# Patient Record
Sex: MALE | Race: WHITE | NOT HISPANIC OR LATINO | Employment: FULL TIME | URBAN - METROPOLITAN AREA
[De-identification: names, ages, dates, MRNs, and addresses within clinical notes are randomized per-mention and may not be internally consistent; named-entity substitution may affect disease eponyms.]

---

## 2024-01-12 ENCOUNTER — APPOINTMENT (OUTPATIENT)
Dept: ORTHOPEDIC SURGERY | Facility: CLINIC | Age: 69
End: 2024-01-12
Payer: MEDICARE

## 2024-01-12 ENCOUNTER — OFFICE VISIT (OUTPATIENT)
Dept: ORTHOPEDIC SURGERY | Facility: CLINIC | Age: 69
End: 2024-01-12
Payer: MEDICARE

## 2024-01-12 DIAGNOSIS — M50.20 CERVICAL DISC DISPLACEMENT: ICD-10-CM

## 2024-01-12 DIAGNOSIS — M54.12 CERVICAL RADICULOPATHY: Primary | ICD-10-CM

## 2024-01-12 PROCEDURE — 99205 OFFICE O/P NEW HI 60 MIN: CPT | Performed by: ORTHOPAEDIC SURGERY

## 2024-01-12 NOTE — LETTER
January 12, 2024     Domingo Ugalde MD  [address] [facility] [address]    Patient: Rj Heller   YOB: 1955   Date of Visit: 1/12/2024       Dear Dr. Domingo Ugalde MD:    Thank you for referring Rj Heller to me for evaluation. Below are my notes for this consultation.  If you have questions, please do not hesitate to call me. I look forward to following your patient along with you.       Sincerely,     Negro Garcia MD      CC: No Recipients  ______________________________________________________________________________________    HPI:Rj Heller is a 68-year-old man, comes in today for second opinion.  For the last several months he has been having some pain into his right arm goes down the arm in a C6 distribution.  There is associated numbness and tingling.  He also has intermittent intrascapular pain.  He has been doing physical therapy, cervical traction, and has had oral steroids.  His symptoms overall are tolerable.  He has been treated at the [facility].  He comes in for second opinion.      ROS:  Reviewed on EMR and patient intake sheet.    PMH/SH:   Reviewed on EMR and patient intake sheet.    Exam:  Physical Exam    Constitutional: Well appearing; no acute distress  Eyes: pupils are equal and round  Psych: normal affect  Respiratory: non-labored breathing  Cardiovascular: regular rate and rhythm  GI: non-distended abdomen  Musculoskeletal: no pain with range of motion of the shoulders bilaterally; no signs of impingement  Neurologic: [5]/5 strength in the upper extremities bilaterally]; [negative Stern's]; [no hyper-reflexia]; positive Spurling's    Radiology:  MRI was personally reviewed.  It does demonstrate a C4-5 spondylolisthesis.  He has a right paracentral and foraminal disc herniation at C5-6 producing C6 root compression.  No cervical canal stenosis or cord compression.      Diagnosis:  Cervical radiculopathy    Assessment and  Plan:   68-year-old man, with cervical radiculopathy secondary to C5-6 disc herniation.  As long as his symptoms are tolerable, the nonoperative management would certainly be appropriate.  We did talk today about the utility of steroid injections.  I discussed the potential risks of those injections, including but not limited to the potential for stroke and/or neurologic injury, and advised against injections.  Additionally, we talked about surgery in case his symptoms do become intolerable long-term.  He would need a C5-6 ACDF.  We went over that surgery today, in regards to the specifics of the procedure, the approach, the expected outcomes and the potential for any long-term associated complications or ramifications of surgery.  The patient at this time would like to continue with nonoperative management.  He was appreciative for the second opinion.  He will follow-up as necessary should his symptoms become intolerable.    The patient was in agreement with the plan. At the end of the visit today, the patient felt that all questions had been answered satisfactorily.  The patient was pleased with the visit and very appreciative for the care rendered.     Thank you very much for the kind referral.  It is a privilege, and a pleasure, to partner with you in the care of your patients.  I would be delighted to assist you with any further consultations as needed.      Negro Garcia MD    Chief of Spine Surgery, [facility]  Director of Spine Service, [facility]  , Department of Orthopaedics  [facility] [employer]  [address]  [place] [id number]  P: [phone]    This note was dictated with voice recognition software.  It has not been proofread for grammatical errors, typographical mistakes or other semantic inconsistencies.

## 2024-01-12 NOTE — PROGRESS NOTES
HPI:Rj Heller is a 68-year-old man, comes in today for second opinion.  For the last several months he has been having some pain into his right arm goes down the arm in a C6 distribution.  There is associated numbness and tingling.  He also has intermittent intrascapular pain.  He has been doing physical therapy, cervical traction, and has had oral steroids.  His symptoms overall are tolerable.  He has been treated at the [facility].  He comes in for second opinion.      ROS:  Reviewed on EMR and patient intake sheet.    PMH/SH:   Reviewed on EMR and patient intake sheet.    Exam:  Physical Exam    Constitutional: Well appearing; no acute distress  Eyes: pupils are equal and round  Psych: normal affect  Respiratory: non-labored breathing  Cardiovascular: regular rate and rhythm  GI: non-distended abdomen  Musculoskeletal: no pain with range of motion of the shoulders bilaterally; no signs of impingement  Neurologic: [5]/5 strength in the upper extremities bilaterally]; [negative Stern's]; [no hyper-reflexia]; positive Spurling's    Radiology:  MRI was personally reviewed.  It does demonstrate a C4-5 spondylolisthesis.  He has a right paracentral and foraminal disc herniation at C5-6 producing C6 root compression.  No cervical canal stenosis or cord compression.      Diagnosis:  Cervical radiculopathy    Assessment and Plan:   68-year-old man, with cervical radiculopathy secondary to C5-6 disc herniation.  As long as his symptoms are tolerable, the nonoperative management would certainly be appropriate.  We did talk today about the utility of steroid injections.  I discussed the potential risks of those injections, including but not limited to the potential for stroke and/or neurologic injury, and advised against injections.  Additionally, we talked about surgery in case his symptoms do become intolerable long-term.  He would need a C5-6 ACDF.  We went over that surgery today, in regards to the  specifics of the procedure, the approach, the expected outcomes and the potential for any long-term associated complications or ramifications of surgery.  The patient at this time would like to continue with nonoperative management.  He was appreciative for the second opinion.  He will follow-up as necessary should his symptoms become intolerable.    The patient was in agreement with the plan. At the end of the visit today, the patient felt that all questions had been answered satisfactorily.  The patient was pleased with the visit and very appreciative for the care rendered.     Thank you very much for the kind referral.  It is a privilege, and a pleasure, to partner with you in the care of your patients.  I would be delighted to assist you with any further consultations as needed.      Negro Garcia MD    Chief of Spine Surgery, [facility]  Director of Spine Service, [facility]  , Department of Orthopaedics  [facility] [employer]  [address] [place] [id number]  P: [phone]    This note was dictated with voice recognition software.  It has not been proofread for grammatical errors, typographical mistakes or other semantic inconsistencies.